# Patient Record
(demographics unavailable — no encounter records)

---

## 2024-11-08 NOTE — HEALTH RISK ASSESSMENT
[Yes] : Yes [Monthly or less (1 pt)] : Monthly or less (1 point) [1 or 2 (0 pts)] : 1 or 2 (0 points) [Never (0 pts)] : Never (0 points) [0] : 2) Feeling down, depressed, or hopeless: Not at all (0) [PHQ-2 Negative - No further assessment needed] : PHQ-2 Negative - No further assessment needed [Patient reported colonoscopy was normal] : Patient reported colonoscopy was normal [HIV Test offered] : HIV Test offered [Hepatitis C test offered] : Hepatitis C test offered [Employed] : employed [Single] : single [Never] : Never [Audit-CScore] : 1 [de-identified] : plays tennis once a week [de-identified] : as described in HPI, generally healthy per patient [[id number]] : 0 [ColonoscopyDate] : 2023 [ColonoscopyComments] : november [FreeTextEntry2] :

## 2024-11-08 NOTE — PHYSICAL EXAM
[Normal Sclera/Conjunctiva] : normal sclera/conjunctiva [EOMI] : extraocular movements intact [Normal] : soft, non-tender, non-distended, no masses palpated, no HSM and normal bowel sounds [Coordination Grossly Intact] : coordination grossly intact [No Focal Deficits] : no focal deficits [Normal Gait] : normal gait [Normal Affect] : the affect was normal [Normal Insight/Judgement] : insight and judgment were intact

## 2024-11-08 NOTE — REVIEW OF SYSTEMS
[Recent Change In Weight] : ~T recent weight change [Abdominal Pain] : abdominal pain [Heartburn] : heartburn [Negative] : Heme/Lymph [Nausea] : no nausea [Constipation] : no constipation [Diarrhea] : no diarrhea [Vomiting] : no vomiting [Melena] : no melena [FreeTextEntry2] : 2/2 altered diet [FreeTextEntry7] : as described in HPI

## 2024-11-08 NOTE — HISTORY OF PRESENT ILLNESS
[FreeTextEntry1] : CPE, abd discomfort [de-identified] : 54 y/o pmh gastritis, duodenitis, GERD presents for CPE and to discuss abdominal discomfort. Patient states that ~1 year ago he started experiencing pain in the center of his abdomen after he ate, went to GI - GI did colonoscopy and endoscopy. Endoscopy found gastritis, duodenitis, hiatal hernia. He was prescribed ppi for 3 months and he felt significantly better after. Recently, he has started experiencing the same symptoms again. He has been using apple cider vinegar, mastic gum, fennel seeds in an attempt to avoid using medications. He states he has cut out almost all foods that typically cause GERD, but still feels symptoms at times and feels like food gets stuck intermittently. States he has lost 10 pounds in a month 2/2 dietary changes. He tested himself for H. Pylori recently which was negative. Worried he may have SIBO and requests abx.

## 2025-04-29 NOTE — PHYSICAL EXAM
[Normal Sclera/Conjunctiva] : normal sclera/conjunctiva [EOMI] : extraocular movements intact [Normal] : normal rate, regular rhythm, normal S1 and S2 and no murmur heard [Soft] : abdomen soft [Non Tender] : non-tender [Non-distended] : non-distended [No Masses] : no abdominal mass palpated [No HSM] : no HSM [de-identified] : benign exam

## 2025-04-29 NOTE — HISTORY OF PRESENT ILLNESS
[FreeTextEntry1] : abdominal pain [de-identified] : complains of constant abdominal pain for the past 1+ month. He is scared to eat, as he is worried about the abdominal pain. He is unable to pinpoint an exact location, and states the discomfort is diffuse. Denies nausea or vomiting.  Notes that last week he developed a bruise on his abdomen without trauma that went away on its own. Worried that it was a rectus sheeth hematoma vs a liver/pancreatic issue.  Notes that his stools have been very loose as well during this period  has been taking omeprazole intermittently using apple cider vinegar